# Patient Record
Sex: MALE | Race: BLACK OR AFRICAN AMERICAN | NOT HISPANIC OR LATINO | Employment: OTHER | URBAN - METROPOLITAN AREA
[De-identification: names, ages, dates, MRNs, and addresses within clinical notes are randomized per-mention and may not be internally consistent; named-entity substitution may affect disease eponyms.]

---

## 2017-06-03 ENCOUNTER — HOSPITAL ENCOUNTER (EMERGENCY)
Facility: OTHER | Age: 52
Discharge: HOME | End: 2017-06-03
Attending: EMERGENCY MEDICINE
Payer: MEDICARE

## 2017-06-03 VITALS
TEMPERATURE: 98 F | SYSTOLIC BLOOD PRESSURE: 118 MMHG | BODY MASS INDEX: 31.39 KG/M2 | DIASTOLIC BLOOD PRESSURE: 72 MMHG | RESPIRATION RATE: 18 BRPM | HEIGHT: 67 IN | OXYGEN SATURATION: 98 % | HEART RATE: 82 BPM | WEIGHT: 200 LBS

## 2017-06-03 DIAGNOSIS — B37.81 CANDIDA ESOPHAGITIS: ICD-10-CM

## 2017-06-03 DIAGNOSIS — L02.11 ABSCESS, NECK: Primary | ICD-10-CM

## 2017-06-03 PROCEDURE — 10060 I&D ABSCESS SIMPLE/SINGLE: CPT

## 2017-06-03 PROCEDURE — 99283 EMERGENCY DEPT VISIT LOW MDM: CPT | Mod: 25

## 2017-06-03 PROCEDURE — 25000003 PHARM REV CODE 250: Performed by: EMERGENCY MEDICINE

## 2017-06-03 RX ORDER — FLUCONAZOLE 200 MG/1
200 TABLET ORAL DAILY
Qty: 14 TABLET | Refills: 0 | Status: SHIPPED | OUTPATIENT
Start: 2017-06-03 | End: 2017-06-17

## 2017-06-03 RX ORDER — SULFAMETHOXAZOLE AND TRIMETHOPRIM 800; 160 MG/1; MG/1
1 TABLET ORAL
Status: COMPLETED | OUTPATIENT
Start: 2017-06-03 | End: 2017-06-03

## 2017-06-03 RX ORDER — LIDOCAINE HYDROCHLORIDE AND EPINEPHRINE 10; 10 MG/ML; UG/ML
10 INJECTION, SOLUTION INFILTRATION; PERINEURAL
Status: DISCONTINUED | OUTPATIENT
Start: 2017-06-03 | End: 2017-06-03

## 2017-06-03 RX ORDER — SULFAMETHOXAZOLE AND TRIMETHOPRIM 800; 160 MG/1; MG/1
1 TABLET ORAL 2 TIMES DAILY
Qty: 12 TABLET | Refills: 0 | Status: SHIPPED | OUTPATIENT
Start: 2017-06-03 | End: 2017-06-10

## 2017-06-03 RX ORDER — LIDOCAINE HYDROCHLORIDE 10 MG/ML
5 INJECTION INFILTRATION; PERINEURAL
Status: COMPLETED | OUTPATIENT
Start: 2017-06-03 | End: 2017-06-03

## 2017-06-03 RX ORDER — FLUCONAZOLE 100 MG/1
400 TABLET ORAL
Status: COMPLETED | OUTPATIENT
Start: 2017-06-03 | End: 2017-06-03

## 2017-06-03 RX ADMIN — LIDOCAINE HYDROCHLORIDE 5 ML: 10 INJECTION, SOLUTION INFILTRATION; PERINEURAL at 05:06

## 2017-06-03 RX ADMIN — SULFAMETHOXAZOLE AND TRIMETHOPRIM 1 TABLET: 800; 160 TABLET ORAL at 05:06

## 2017-06-03 RX ADMIN — FLUCONAZOLE 400 MG: 100 TABLET ORAL at 04:06

## 2017-06-03 RX ADMIN — LIDOCAINE HYDROCHLORIDE: 20 SOLUTION ORAL; TOPICAL at 04:06

## 2017-06-03 NOTE — ED PROVIDER NOTES
"Encounter Date: 6/3/2017    SCRIBE #1 NOTE: I, Elma Acevedosanto, am scribing for, and in the presence of, Dr. Summers.       History     Chief Complaint   Patient presents with    Insect Bite     pt reports "i have a spider bite on my neck"    Sore Throat     my throat hurts too     Review of patient's allergies indicates:   Allergen Reactions    Iodine and iodide containing products      Time seen by provider: 4:35 AM    This is a 51 y.o. male with history of HTN, HIV, and seizures who presents with complaint of sore throat that has persisted for 1 week. The patient mentions no associated symptoms including fever, chills, congestion, rhinorrhea, cough, SOB, abdominal pain, nausea, or vomiting. He also complains of an abscess to the right lateral neck that has persisted for the past week. He states that he attempted to "pop" the abscess yesterday, and "white pus" erupted from it. He mentions no redness or warmth. He reports no identifying, alleviating, or exacerbating factors. The patient admits that he has not attempted to alleviate his discomfort with any OTC medication. He denies any recent hospitalizations. Although he is unsure as to his last T-cell count, he states that he has been told by his infectious disease specialist that his viral load was undetectable and is compliant with HAART.      The history is provided by the patient.     Past Medical History:   Diagnosis Date    HIV (human immunodeficiency virus infection)     Hypertension     Immune deficiency disorder     Seizures      History reviewed. No pertinent surgical history.  Family History   Problem Relation Age of Onset    Cancer Father      Social History   Substance Use Topics    Smoking status: Never Smoker    Smokeless tobacco: Never Used    Alcohol use 18.0 oz/week     30 Cans of beer per week      Comment: Pt reports drinking about 1 6 pack of beer daily.      Review of Systems   Constitutional: Negative for chills and fever. "   HENT: Positive for sore throat. Negative for congestion, facial swelling and rhinorrhea.    Respiratory: Negative for cough, chest tightness and shortness of breath.    Cardiovascular: Negative for chest pain.   Gastrointestinal: Negative for abdominal pain, nausea and vomiting.   Endocrine: Negative for polyuria.   Genitourinary: Negative for dysuria.   Musculoskeletal: Negative for myalgias.   Skin: Negative for color change and rash.        Positive for abscess. Negative for warmth.   Neurological: Negative for headaches.       Physical Exam     Initial Vitals [06/03/17 0250]   BP Pulse Resp Temp SpO2   133/88 81 18 97.9 °F (36.6 °C) 96 %     Physical Exam    Nursing note and vitals reviewed.  Constitutional: He appears well-developed and well-nourished. He is not diaphoretic. No distress.   HENT:   Head: Normocephalic and atraumatic.   Right Ear: External ear normal.   Left Ear: External ear normal.   White lesions to the posterior pharynx consistent with candida.   Eyes: EOM are normal. Right eye exhibits no discharge. Left eye exhibits no discharge.   Neck: Normal range of motion.   Cardiovascular: Normal rate, regular rhythm and normal heart sounds. Exam reveals no gallop and no friction rub.    No murmur heard.  Pulmonary/Chest: Breath sounds normal. No respiratory distress. He has no wheezes. He has no rhonchi. He has no rales.   Abdominal: Soft. There is no tenderness. There is no rebound and no guarding.   Musculoskeletal: Normal range of motion. He exhibits no edema or tenderness.   Neurological: He is alert and oriented to person, place, and time.   Skin: Skin is warm and dry. Abscess noted. No rash noted. No erythema. No pallor.   Indurated mass consistent with abscess approximately 4 cm in diameter to the right lateral neck. No surrounding erythema or drainage.    Psychiatric: He has a normal mood and affect. His behavior is normal. Judgment and thought content normal.         ED Course   I & D -  Incision and Drainage  Date/Time: 6/3/2017 6:14 AM  Performed by: CORIE MCINTYRE  Authorized by: CORIE MCINTYRE   Consent Done: Yes  Consent: Verbal consent obtained.  Risks and benefits: risks, benefits and alternatives were discussed  Consent given by: patient  Type: abscess  Body area: head/neck  Location details: neck  Anesthesia: local infiltration    Anesthesia:  Anesthesia: local infiltration  Local Anesthetic: lidocaine 1% without epinephrine   Anesthetic total: 2 mL  Patient sedated: no  Scalpel size: 11  Incision type: single straight  Complexity: simple  Drainage: bloody and  purulent  Wound treatment: incision and  drainage  Packing material: 1/4 in gauze  Complications: No  Patient tolerance: Patient tolerated the procedure well with no immediate complications        Labs Reviewed - No data to display   Imaging Results    None                 Medical Decision Making:   Initial Assessment:       51M h/o HIV presents with     1) posterior neck abscess, persistent s/p attempted drainage at home. Area indurated with no surrounding cellulitis; bedside sono with small fluid collection so I+D done with small amount drainage, packed and will start Bactrim, f/u PCP within 2 days for packing removal and wound check.    2) sore throat- exam c/w candidal lesions posterior pharynx with no other sign thrush, concerning for candidal esophagitis. Unknown CD4 count but per pt viral load undetectable, on HAART. Will start Diflucan and pt will f/u ID for CD4 count check, re-assess and possible GI referral for EGD if no improvement. No sign strep throat or HSV, pt tolerating PO with no fever or systemic sx to warrant labs or IV treatment.              Scribe Attestation:   Scribe #1: I performed the above scribed service and the documentation accurately describes the services I performed. I attest to the accuracy of the note.    Attending Attestation:           Physician Attestation for Scribe:  Physician  Attestation Statement for Scribe #1: I, Dr. Summers, reviewed documentation, as scribed by Elma Rolon in my presence, and it is both accurate and complete.                 ED Course     Clinical Impression:     1. Abscess, neck    2. Candida esophagitis                Black Summers MD  06/04/17 1059

## 2018-09-22 ENCOUNTER — HOSPITAL ENCOUNTER (EMERGENCY)
Facility: OTHER | Age: 53
Discharge: HOME | End: 2018-09-22
Attending: EMERGENCY MEDICINE
Payer: MEDICARE

## 2018-09-22 VITALS
HEART RATE: 84 BPM | SYSTOLIC BLOOD PRESSURE: 125 MMHG | BODY MASS INDEX: 31.97 KG/M2 | OXYGEN SATURATION: 95 % | DIASTOLIC BLOOD PRESSURE: 74 MMHG | TEMPERATURE: 98 F | RESPIRATION RATE: 17 BRPM | HEIGHT: 67 IN | WEIGHT: 203.69 LBS

## 2018-09-22 DIAGNOSIS — M25.569 KNEE PAIN: Primary | ICD-10-CM

## 2018-09-22 DIAGNOSIS — M10.372 ACUTE GOUT DUE TO RENAL IMPAIRMENT INVOLVING TOE OF LEFT FOOT: ICD-10-CM

## 2018-09-22 PROCEDURE — 63600175 PHARM REV CODE 636 W HCPCS: Performed by: EMERGENCY MEDICINE

## 2018-09-22 PROCEDURE — 96372 THER/PROPH/DIAG INJ SC/IM: CPT

## 2018-09-22 PROCEDURE — 99283 EMERGENCY DEPT VISIT LOW MDM: CPT | Mod: 25

## 2018-09-22 RX ORDER — KETOROLAC TROMETHAMINE 30 MG/ML
60 INJECTION, SOLUTION INTRAMUSCULAR; INTRAVENOUS
Status: COMPLETED | OUTPATIENT
Start: 2018-09-22 | End: 2018-09-22

## 2018-09-22 RX ORDER — INDOMETHACIN 50 MG/1
50 CAPSULE ORAL
Qty: 15 CAPSULE | Refills: 0 | Status: SHIPPED | OUTPATIENT
Start: 2018-09-22

## 2018-09-22 RX ORDER — TRAMADOL HYDROCHLORIDE 50 MG/1
50 TABLET ORAL EVERY 6 HOURS PRN
COMMUNITY
End: 2023-09-14

## 2018-09-22 RX ADMIN — KETOROLAC TROMETHAMINE 60 MG: 30 INJECTION, SOLUTION INTRAMUSCULAR at 08:09

## 2018-09-22 NOTE — ED PROVIDER NOTES
"Encounter Date: 9/22/2018    SCRIBE #1 NOTE: I, Kristine Claros, am scribing for, and in the presence of,  Dr. Clemente I have scribed the entire note.       History     Chief Complaint   Patient presents with    Gout     to L leg x 4 days and "I got a berl on my butt"     Time seen by provider: 7:40 AM    This is a 53 y.o. male who presents with complaint of gout since Wednesday. He notes that he only takes medicine when necessary, and is not on anything daily for gout. He reports pain to the left knee. He also notes pain and swelling in the left foot. He denies wrapping the knee. He denies any traumatic event that could cause pain in his knee.  He denies having gout in his knee in the past.  He denies being diabetic.  He denies any fevers, numbness/tingling.  He denies any erythema or swelling of that knee.      He notes that he has a boil on his buttock for the past few days.  He did note that it was draining but has not any more.      The history is provided by the patient.     Review of patient's allergies indicates:   Allergen Reactions    Iodine and iodide containing products      Past Medical History:   Diagnosis Date    HIV (human immunodeficiency virus infection)     Hypertension     Immune deficiency disorder     Seizures      History reviewed. No pertinent surgical history.  Family History   Problem Relation Age of Onset    Cancer Father      Social History     Tobacco Use    Smoking status: Never Smoker    Smokeless tobacco: Never Used   Substance Use Topics    Alcohol use: Yes     Alcohol/week: 18.0 oz     Types: 30 Cans of beer per week     Comment: Pt reports drinking about 1 6 pack of beer daily.     Drug use: No     Review of Systems   Constitutional: Negative for chills and fever.   Gastrointestinal: Negative for constipation, diarrhea, nausea and vomiting.   Genitourinary: Negative for urgency.   Musculoskeletal: Positive for joint swelling. Negative for back pain.   Skin: Negative for color " change and wound.        Positive for boil on lower backside   Neurological: Negative for dizziness and numbness.   All other systems reviewed and are negative.      Physical Exam     Initial Vitals [09/22/18 0636]   BP Pulse Resp Temp SpO2   132/81 87 20 98.4 °F (36.9 °C) 97 %      MAP       --         Physical Exam    Nursing note and vitals reviewed.  Constitutional: He appears well-developed and well-nourished. He is not diaphoretic. No distress.   HENT:   Head: Normocephalic and atraumatic.   Eyes: Conjunctivae and EOM are normal. No scleral icterus.   Neck: Normal range of motion. Neck supple.   Cardiovascular:   2+ DP/PT pulses bilaterally.   Pulmonary/Chest: No respiratory distress.   Musculoskeletal: Normal range of motion. He exhibits no edema or tenderness.   L knee:  Tenderness along the medial joint line.  No erythema, no swelling. No warmth to touch.  Full range of motion intact. No joint instability or laxity.    L foot:  Swelling and tenderness noted to the 1st left metatarsal.  No erythema.  No warmth to touch.   Lymphadenopathy:     He has no cervical adenopathy.   Neurological: He is alert and oriented to person, place, and time.   Skin: Skin is warm and dry. No rash noted. No erythema. No pallor.   L buttock:  1cm area of swelling, induration, tenderness along the medial aspect.  No drainage or fluctuation.  No erythema.         ED Course   Procedures  Labs Reviewed - No data to display          Imaging Results    None       X-Rays:   Independently Interpreted Readings:   Other Readings:  Knee XR:  No acute fracture or dislocation.    Medical Decision Making:   History:   Old Medical Records: I decided to obtain old medical records.  Old Records Summarized: other records and records from clinic visits.  Initial Assessment:   7:40AM:  Patient is a 53-year-old male who presents to the emergency department with left knee and foot pain.  Patient appears well, nontoxic.  The foot does appear to have  gout with swelling and tenderness to the metatarsal joint.  However, the knee does not seem consistent with gout.  He does not have any swelling or erythema.  He has focal tenderness to the medial joint line.  Will plan for NSAIDs, x-ray, will continue to follow and reassess.  Independently Interpreted Test(s):   I have ordered and independently interpreted X-rays - see prior notes.  Clinical Tests:   Radiological Study: Ordered and Reviewed      8:33 AM:  Patient doing well. He remains stable. His pain has improved after the NSAIDs.  His x-ray is negative for any acute findings.  Will plan to treat with a course of NSAIDs, which would alleviate any gout symptoms, along with any acute knee issues.  Will plan for an Ace bandage.  I updated the patient regarding the results and I counseled the patient regarding supportive care measures.  I have discussed with the pt ED return warnings and need for close PCP f/u.  Pt agreeable to plan and all questions answered.  I feel that pt is stable for discharge and management as an outpatient and no further intervention is needed at this time.  Pt is comfortable returning to the ED if needed.  Will DC home in stable condition.                Scribe Attestation:   Scribe #1: I performed the above scribed service and the documentation accurately describes the services I performed. I attest to the accuracy of the note.    Attending Attestation:           Physician Attestation for Scribe:  Physician Attestation Statement for Scribe #1: I, Dr. Pryor, reviewed documentation, as scribed by Kristine Claros in my presence, and it is both accurate and complete.                    Clinical Impression:     1. Knee pain    2. Acute gout due to renal impairment involving toe of left foot                                   Ximena Pryor MD  09/22/18 3460

## 2018-09-22 NOTE — ED TRIAGE NOTES
"Pt reports to ED c/o bilateral knee and ankle pain x 4 days. Pt reports "gout flare-up" and ran out of medication. Also reports abscess to buttocks "but I think it bust".     Denies recent fall or trauma.   "

## 2019-03-23 ENCOUNTER — HOSPITAL ENCOUNTER (EMERGENCY)
Facility: OTHER | Age: 54
Discharge: HOME | End: 2019-03-23
Attending: EMERGENCY MEDICINE
Payer: MEDICARE

## 2019-03-23 VITALS
HEIGHT: 68 IN | BODY MASS INDEX: 30.01 KG/M2 | WEIGHT: 198 LBS | SYSTOLIC BLOOD PRESSURE: 160 MMHG | DIASTOLIC BLOOD PRESSURE: 97 MMHG | HEART RATE: 87 BPM | TEMPERATURE: 99 F | RESPIRATION RATE: 16 BRPM | OXYGEN SATURATION: 97 %

## 2019-03-23 DIAGNOSIS — R73.9 HYPERGLYCEMIA, UNSPECIFIED: ICD-10-CM

## 2019-03-23 DIAGNOSIS — B37.42 CANDIDAL BALANITIS: Primary | ICD-10-CM

## 2019-03-23 LAB — POCT GLUCOSE: 303 MG/DL (ref 70–110)

## 2019-03-23 PROCEDURE — 99283 EMERGENCY DEPT VISIT LOW MDM: CPT

## 2019-03-23 PROCEDURE — 82962 GLUCOSE BLOOD TEST: CPT

## 2019-03-23 RX ORDER — CLOTRIMAZOLE 1 %
CREAM (GRAM) TOPICAL
Qty: 15 G | Refills: 0 | Status: SHIPPED | OUTPATIENT
Start: 2019-03-23

## 2019-03-23 RX ORDER — ACYCLOVIR 400 MG/1
TABLET ORAL 2 TIMES DAILY
COMMUNITY

## 2019-03-23 RX ORDER — METFORMIN HYDROCHLORIDE 500 MG/1
500 TABLET ORAL
Qty: 30 TABLET | Refills: 0 | Status: SHIPPED | OUTPATIENT
Start: 2019-03-23 | End: 2020-03-22

## 2019-03-23 NOTE — ED NOTES
CAPILLARY BLOOD GLUCOSE:  303    Dr. Pryor notified of elevated CBG. Ok to move pt to RWR per Dr. Pryor.

## 2019-03-23 NOTE — ED PROVIDER NOTES
"Encounter Date: 3/23/2019    SCRIBE #1 NOTE: I, Melafrancoise Yeh, am scribing for, and in the presence of, Dr. Pryor.       History     Chief Complaint   Patient presents with    Penis Pain     PMH of genital herpes, reporting "havent had an outbreak in years." Reporting sores to penis x several days, denies fever, chills.      Time seen by provider: 4:20 PM    This is a 53 y.o. Male, with history of HIV, genital herpes, and HTN, who presents to the ED with complaint of sudden onset sores and ulcers to the penile head for the past thee days. Patient states the ulcers normally come on the skin surrounding the penis when he has a herpes flare up. Patient denies any new sexual partners. Patient denies any fever, chills, or penile discharge. Patient states he has been increasing his water intake which leads to urine frequency. Patient denies history of DM. Patient's PCP is at [facility].      The history is provided by the patient.     Review of patient's allergies indicates:   Allergen Reactions    Iodine and iodide containing products      Past Medical History:   Diagnosis Date    HIV (human immunodeficiency virus infection)     Hypertension     Immune deficiency disorder     Seizures      No past surgical history on file.  Family History   Problem Relation Age of Onset    Cancer Father      Social History     Tobacco Use    Smoking status: Never Smoker    Smokeless tobacco: Never Used   Substance Use Topics    Alcohol use: Yes     Alcohol/week: 18.0 oz     Types: 30 Cans of beer per week     Comment: Pt reports drinking about 1 6 pack of beer daily.     Drug use: No     Review of Systems   Constitutional: Negative for chills and fever.   HENT: Negative for sore throat.    Respiratory: Negative for shortness of breath.    Cardiovascular: Negative for chest pain.   Gastrointestinal: Negative for nausea.   Genitourinary: Positive for frequency and penile pain. Negative for discharge and dysuria.        Positive for " penile ulcers/sores.   Musculoskeletal: Negative for back pain.   Skin: Negative for rash.   Neurological: Negative for weakness.   Hematological: Does not bruise/bleed easily.       Physical Exam     Initial Vitals [03/23/19 1443]   BP Pulse Resp Temp SpO2   (!) 148/95 86 16 98 °F (36.7 °C) 95 %      MAP       --         Physical Exam    Nursing note and vitals reviewed.  Constitutional: He appears well-developed and well-nourished. He is not diaphoretic. No distress.   Obese.   HENT:   Head: Normocephalic and atraumatic.   Eyes: Conjunctivae and EOM are normal. Pupils are equal, round, and reactive to light.   Neck: Normal range of motion. Neck supple.   Genitourinary:   Genitourinary Comments: Normal, uncircumcised male genitalia. No inguinal hernia or lymphadenopathy. Scrotum and testes normal. Whitish exudates on glands under foreskin. Interior foreskin with minor excoriations and erythema, but able to full retract. Whitish lesions on gland with some crusting, unclear if these were originally vesicular.   Musculoskeletal: Normal range of motion. He exhibits no edema or tenderness.   Neurological: He is alert and oriented to person, place, and time. He has normal strength.   Skin: Skin is warm and dry. No rash noted. No erythema. No pallor.   Psychiatric: He has a normal mood and affect. His behavior is normal. Judgment and thought content normal.         ED Course   Procedures  Labs Reviewed   POCT GLUCOSE - Abnormal; Notable for the following components:       Result Value    POCT Glucose 303 (*)     All other components within normal limits          Imaging Results    None                     Scribe Attestation:   Scribe #1: I performed the above scribed service and the documentation accurately describes the services I performed. I attest to the accuracy of the note.    Attending Attestation:           Physician Attestation for Scribe:  Physician Attestation Statement for Scribe #1: I, Dr. Pryor, reviewed  documentation, as scribed by Yajaira Yeh in my presence, and it is both accurate and complete.           Patient presents complaining of irritation sores on the head of the penis for the past few days.  Reports he has had genital herpes in the past and thinks this is an outbreak of it however the sores have turned white.  On my exam he has appears to be candidal balanitis.  There is some skin breakdown on the glans but is unclear if these were originally vesicular/herpetic in nature.  Although he did not have a history of diabetes due to the presence of candidal balanitis obtain an Accu-Chek which is elevated at 300.  Patient has been advised to decrease simple carbohydrates for from his diet.  Will be started on low-dose Glucophage and stressed the need to follow up with his primary care clinic this week for formal diabetic testing, further adjustment of medications as necessary.  Start topical clotrimazole for the balanitis             Clinical Impression:     1. Candidal balanitis    2. Hyperglycemia, unspecified                                   Clemente Pryor II, MD  03/24/19 9062

## 2019-12-26 ENCOUNTER — HOSPITAL ENCOUNTER (EMERGENCY)
Facility: OTHER | Age: 54
Discharge: HOME | End: 2019-12-26
Attending: EMERGENCY MEDICINE
Payer: MEDICARE

## 2019-12-26 VITALS
RESPIRATION RATE: 18 BRPM | HEART RATE: 89 BPM | DIASTOLIC BLOOD PRESSURE: 96 MMHG | TEMPERATURE: 98 F | SYSTOLIC BLOOD PRESSURE: 161 MMHG | WEIGHT: 200 LBS | HEIGHT: 68 IN | OXYGEN SATURATION: 96 % | BODY MASS INDEX: 30.31 KG/M2

## 2019-12-26 DIAGNOSIS — L03.90 CELLULITIS, UNSPECIFIED CELLULITIS SITE: ICD-10-CM

## 2019-12-26 DIAGNOSIS — Z76.89 ENCOUNTER FOR INCISION AND DRAINAGE PROCEDURE: Primary | ICD-10-CM

## 2019-12-26 PROCEDURE — 10060 I&D ABSCESS SIMPLE/SINGLE: CPT

## 2019-12-26 PROCEDURE — 99284 EMERGENCY DEPT VISIT MOD MDM: CPT | Mod: 25

## 2019-12-26 PROCEDURE — 25000003 PHARM REV CODE 250: Performed by: PHYSICIAN ASSISTANT

## 2019-12-26 RX ORDER — IBUPROFEN 600 MG/1
600 TABLET ORAL EVERY 6 HOURS PRN
Qty: 20 TABLET | Refills: 0 | Status: SHIPPED | OUTPATIENT
Start: 2019-12-26

## 2019-12-26 RX ORDER — LIDOCAINE HYDROCHLORIDE 10 MG/ML
10 INJECTION INFILTRATION; PERINEURAL
Status: COMPLETED | OUTPATIENT
Start: 2019-12-26 | End: 2019-12-26

## 2019-12-26 RX ORDER — MUPIROCIN 20 MG/G
OINTMENT TOPICAL 3 TIMES DAILY
Qty: 22 G | Refills: 0 | Status: SHIPPED | OUTPATIENT
Start: 2019-12-26 | End: 2020-01-05

## 2019-12-26 RX ORDER — CLINDAMYCIN HYDROCHLORIDE 300 MG/1
300 CAPSULE ORAL 4 TIMES DAILY
Qty: 40 CAPSULE | Refills: 0 | Status: SHIPPED | OUTPATIENT
Start: 2019-12-26 | End: 2020-01-05

## 2019-12-26 RX ADMIN — LIDOCAINE HYDROCHLORIDE 10 ML: 10 INJECTION, SOLUTION INFILTRATION; PERINEURAL at 09:12

## 2019-12-27 NOTE — ED TRIAGE NOTES
Patient presents to ER with c/o abscess under Left arm for 2 days.  Patient states the area began to drain on yesterday.  Patient reports area painful and swollen.  Patient denies taking anything OTC for pain.

## 2022-11-07 ENCOUNTER — HOSPITAL ENCOUNTER (EMERGENCY)
Facility: OTHER | Age: 57
Discharge: HOME | End: 2022-11-07
Attending: EMERGENCY MEDICINE
Payer: MEDICARE

## 2022-11-07 VITALS
SYSTOLIC BLOOD PRESSURE: 163 MMHG | DIASTOLIC BLOOD PRESSURE: 88 MMHG | TEMPERATURE: 98 F | BODY MASS INDEX: 30.31 KG/M2 | HEIGHT: 68 IN | RESPIRATION RATE: 18 BRPM | OXYGEN SATURATION: 98 % | WEIGHT: 200 LBS | HEART RATE: 62 BPM

## 2022-11-07 DIAGNOSIS — K85.90 ACUTE PANCREATITIS WITHOUT INFECTION OR NECROSIS, UNSPECIFIED PANCREATITIS TYPE: Primary | ICD-10-CM

## 2022-11-07 DIAGNOSIS — K29.70 GASTRITIS, PRESENCE OF BLEEDING UNSPECIFIED, UNSPECIFIED CHRONICITY, UNSPECIFIED GASTRITIS TYPE: ICD-10-CM

## 2022-11-07 DIAGNOSIS — K20.90 ESOPHAGITIS: ICD-10-CM

## 2022-11-07 LAB
ALBUMIN SERPL BCP-MCNC: 4.1 G/DL (ref 3.5–5.2)
ALP SERPL-CCNC: 65 U/L (ref 55–135)
ALT SERPL W/O P-5'-P-CCNC: 19 U/L (ref 10–44)
ANION GAP SERPL CALC-SCNC: 8 MMOL/L (ref 8–16)
AST SERPL-CCNC: 30 U/L (ref 10–40)
BASOPHILS # BLD AUTO: 0.08 K/UL (ref 0–0.2)
BASOPHILS NFR BLD: 1.3 % (ref 0–1.9)
BILIRUB SERPL-MCNC: 0.3 MG/DL (ref 0.1–1)
BILIRUB UR QL STRIP: NEGATIVE
BNP SERPL-MCNC: 15 PG/ML (ref 0–99)
BUN SERPL-MCNC: 11 MG/DL (ref 6–20)
CALCIUM SERPL-MCNC: 9.8 MG/DL (ref 8.7–10.5)
CHLORIDE SERPL-SCNC: 103 MMOL/L (ref 95–110)
CLARITY UR: CLEAR
CO2 SERPL-SCNC: 26 MMOL/L (ref 23–29)
COLOR UR: YELLOW
CREAT SERPL-MCNC: 1.3 MG/DL (ref 0.5–1.4)
CTP QC/QA: YES
CTP QC/QA: YES
DIFFERENTIAL METHOD: ABNORMAL
EOSINOPHIL # BLD AUTO: 0.1 K/UL (ref 0–0.5)
EOSINOPHIL NFR BLD: 2.3 % (ref 0–8)
ERYTHROCYTE [DISTWIDTH] IN BLOOD BY AUTOMATED COUNT: 13.1 % (ref 11.5–14.5)
EST. GFR  (NO RACE VARIABLE): >60 ML/MIN/1.73 M^2
GLUCOSE SERPL-MCNC: 170 MG/DL (ref 70–110)
GLUCOSE UR QL STRIP: NEGATIVE
HCT VFR BLD AUTO: 42.7 % (ref 40–54)
HGB BLD-MCNC: 14.5 G/DL (ref 14–18)
HGB UR QL STRIP: NEGATIVE
IMM GRANULOCYTES # BLD AUTO: 0.02 K/UL (ref 0–0.04)
IMM GRANULOCYTES NFR BLD AUTO: 0.3 % (ref 0–0.5)
KETONES UR QL STRIP: NEGATIVE
LEUKOCYTE ESTERASE UR QL STRIP: NEGATIVE
LIPASE SERPL-CCNC: 201 U/L (ref 4–60)
LYMPHOCYTES # BLD AUTO: 3 K/UL (ref 1–4.8)
LYMPHOCYTES NFR BLD: 49.4 % (ref 18–48)
MCH RBC QN AUTO: 27.5 PG (ref 27–31)
MCHC RBC AUTO-ENTMCNC: 34 G/DL (ref 32–36)
MCV RBC AUTO: 81 FL (ref 82–98)
MONOCYTES # BLD AUTO: 0.4 K/UL (ref 0.3–1)
MONOCYTES NFR BLD: 7.1 % (ref 4–15)
NEUTROPHILS # BLD AUTO: 2.4 K/UL (ref 1.8–7.7)
NEUTROPHILS NFR BLD: 39.6 % (ref 38–73)
NITRITE UR QL STRIP: NEGATIVE
NRBC BLD-RTO: 0 /100 WBC
PH UR STRIP: 8 [PH] (ref 5–8)
PLATELET # BLD AUTO: 247 K/UL (ref 150–450)
PMV BLD AUTO: 8.6 FL (ref 9.2–12.9)
POC MOLECULAR INFLUENZA A AGN: NEGATIVE
POC MOLECULAR INFLUENZA B AGN: NEGATIVE
POTASSIUM SERPL-SCNC: 4.7 MMOL/L (ref 3.5–5.1)
PROT SERPL-MCNC: 8.3 G/DL (ref 6–8.4)
PROT UR QL STRIP: NEGATIVE
RBC # BLD AUTO: 5.28 M/UL (ref 4.6–6.2)
SARS-COV-2 RDRP RESP QL NAA+PROBE: NEGATIVE
SODIUM SERPL-SCNC: 137 MMOL/L (ref 136–145)
SP GR UR STRIP: 1.01 (ref 1–1.03)
TROPONIN I SERPL DL<=0.01 NG/ML-MCNC: 0.01 NG/ML (ref 0–0.03)
URN SPEC COLLECT METH UR: NORMAL
UROBILINOGEN UR STRIP-ACNC: NEGATIVE EU/DL
WBC # BLD AUTO: 6.16 K/UL (ref 3.9–12.7)

## 2022-11-07 PROCEDURE — 83690 ASSAY OF LIPASE: CPT | Performed by: PHYSICIAN ASSISTANT

## 2022-11-07 PROCEDURE — 85025 COMPLETE CBC W/AUTO DIFF WBC: CPT | Performed by: PHYSICIAN ASSISTANT

## 2022-11-07 PROCEDURE — 25500020 PHARM REV CODE 255: Performed by: PHYSICIAN ASSISTANT

## 2022-11-07 PROCEDURE — 87635 SARS-COV-2 COVID-19 AMP PRB: CPT | Performed by: PHYSICIAN ASSISTANT

## 2022-11-07 PROCEDURE — 84484 ASSAY OF TROPONIN QUANT: CPT | Performed by: PHYSICIAN ASSISTANT

## 2022-11-07 PROCEDURE — 83880 ASSAY OF NATRIURETIC PEPTIDE: CPT | Performed by: PHYSICIAN ASSISTANT

## 2022-11-07 PROCEDURE — 96375 TX/PRO/DX INJ NEW DRUG ADDON: CPT

## 2022-11-07 PROCEDURE — 80053 COMPREHEN METABOLIC PANEL: CPT | Performed by: PHYSICIAN ASSISTANT

## 2022-11-07 PROCEDURE — 63600175 PHARM REV CODE 636 W HCPCS: Performed by: PHYSICIAN ASSISTANT

## 2022-11-07 PROCEDURE — 81003 URINALYSIS AUTO W/O SCOPE: CPT | Performed by: PHYSICIAN ASSISTANT

## 2022-11-07 PROCEDURE — 25000003 PHARM REV CODE 250: Performed by: PHYSICIAN ASSISTANT

## 2022-11-07 PROCEDURE — 99285 EMERGENCY DEPT VISIT HI MDM: CPT | Mod: 25

## 2022-11-07 PROCEDURE — 96361 HYDRATE IV INFUSION ADD-ON: CPT

## 2022-11-07 PROCEDURE — 96374 THER/PROPH/DIAG INJ IV PUSH: CPT

## 2022-11-07 RX ORDER — OXYCODONE AND ACETAMINOPHEN 5; 325 MG/1; MG/1
1 TABLET ORAL EVERY 6 HOURS PRN
Qty: 12 TABLET | Refills: 0 | Status: SHIPPED | OUTPATIENT
Start: 2022-11-07

## 2022-11-07 RX ORDER — DIPHENHYDRAMINE HYDROCHLORIDE 50 MG/ML
25 INJECTION INTRAMUSCULAR; INTRAVENOUS
Status: COMPLETED | OUTPATIENT
Start: 2022-11-07 | End: 2022-11-07

## 2022-11-07 RX ORDER — ONDANSETRON 4 MG/1
4 TABLET, ORALLY DISINTEGRATING ORAL EVERY 6 HOURS PRN
Qty: 15 TABLET | Refills: 0 | OUTPATIENT
Start: 2022-11-07 | End: 2023-09-14

## 2022-11-07 RX ORDER — MORPHINE SULFATE 4 MG/ML
4 INJECTION, SOLUTION INTRAMUSCULAR; INTRAVENOUS
Status: COMPLETED | OUTPATIENT
Start: 2022-11-07 | End: 2022-11-07

## 2022-11-07 RX ADMIN — DIPHENHYDRAMINE HYDROCHLORIDE 25 MG: 50 INJECTION, SOLUTION INTRAMUSCULAR; INTRAVENOUS at 12:11

## 2022-11-07 RX ADMIN — MORPHINE SULFATE 4 MG: 4 INJECTION, SOLUTION INTRAMUSCULAR; INTRAVENOUS at 12:11

## 2022-11-07 RX ADMIN — SODIUM CHLORIDE 1000 ML: 0.9 INJECTION, SOLUTION INTRAVENOUS at 12:11

## 2022-11-07 RX ADMIN — IOHEXOL 100 ML: 350 INJECTION, SOLUTION INTRAVENOUS at 01:11

## 2022-11-07 NOTE — ED NOTES
Pt presented to ER with c/o SOB which has worsened over the last 3 days. No acute distress noted. Family at bedside. Will continue to monitor.     LOC: The patient is awake, alert, and oriented to self, place, time, and situation. Pt is calm and cooperative. Affect is appropriate.  Speech is appropriate and clear.     APPEARANCE: Patient resting comfortably in no acute distress.  Patient is clean and well groomed.    SKIN: The skin is warm and dry; color consistent with ethnicity.  Patient has normal skin turgor and moist mucus membranes.  Skin intact; no breakdown or bruising noted.     MUSCULOSKELETAL: Patient moving upper and lower extremities without difficulty; denies pain in the extremities or back.  Denies weakness.     RESPIRATORY: Airway is open and patent. Respirations spontaneous, even, easy, and non-labored.  Patient has a normal effort and rate.  No accessory muscle use noted. Denies cough.     CARDIAC:  Normal rate noted.  No peripheral edema noted. Pt complaints of chest pressure when coughing. Stated he is always SOB.     ABDOMEN: Soft and non tender to palpation.  No distention noted. Pt denies abdominal pain; denies nausea, vomiting, diarrhea, or constipation.    NEUROLOGIC: Eyes open spontaneously.  Behavior appropriate to situation.  Follows commands; facial expression symmetrical.  Purposeful motor response noted; normal sensation in all extremities. Pt denies headache; denies lightheadedness or dizziness; denies visual disturbances; denies loss of balance; denies unilateral weakness.

## 2022-11-07 NOTE — ED PROVIDER NOTES
"Encounter Date: 11/7/2022       History     Chief Complaint   Patient presents with    Shortness of Breath     Patient to ED with c/o intermittent shortness of breath with chest discomfort and pain on inspiration x months . States " I haven't been able to go to work in 3 days . " EKG done in triage     Patient is a 56 y/o M HTN, T2DM, HIV (viral load undetectable), and seizures that presents to the ED today with worsening chest pain, shortness of breath and abdominal pain over the past 3 days.  Patient reports he has had occasional sharp chest pain and SOB x 2 years which usually resolves on its own.  Pain is becoming more frequent and longer in duration.  Seen by cardiology at [facility] in October 2021 and diagnosed with LVEDP with normal right heart filling pressures.  No obstructive coronary disease. He is currently being treated for HFpEF.  Patient reports penile and scrotal tenderness x 3 days.  No pain or burning with urination.  Last BM was this morning.  Patient also reports chronic lower leg tenderness which has been worsening x 3 days. Denies N/V/D, fever.      Review of patient's allergies indicates:   Allergen Reactions    Iodine and iodide containing products     Shellfish containing products      Past Medical History:   Diagnosis Date    HIV (human immunodeficiency virus infection)     Hypertension     Immune deficiency disorder     Seizures      No past surgical history on file.  Family History   Problem Relation Age of Onset    Cancer Father      Social History     Tobacco Use    Smoking status: Never    Smokeless tobacco: Never   Substance Use Topics    Alcohol use: Yes     Alcohol/week: 30.0 standard drinks     Types: 30 Cans of beer per week     Comment: Pt reports drinking about 1 6 pack of beer daily.     Drug use: No     Review of Systems   Constitutional:  Negative for activity change, appetite change and fever.   HENT:  Negative for congestion.    Respiratory:  Positive for shortness of " breath. Negative for cough.    Cardiovascular:  Positive for chest pain. Negative for palpitations and leg swelling.   Gastrointestinal:  Positive for abdominal pain. Negative for constipation, diarrhea, nausea and vomiting.   Endocrine: Negative for polyuria.   Genitourinary:  Positive for penile pain and testicular pain. Negative for difficulty urinating, flank pain and penile discharge.   Musculoskeletal:  Positive for arthralgias, back pain and myalgias.   Skin:  Negative for color change, pallor, rash and wound.   Allergic/Immunologic: Positive for immunocompromised state.   Neurological:  Positive for headaches.   Psychiatric/Behavioral:  Negative for confusion.      Physical Exam     Initial Vitals [11/07/22 0924]   BP Pulse Resp Temp SpO2   129/76 72 16 98.9 °F (37.2 °C) 96 %      MAP       --         Physical Exam    Constitutional: He appears well-developed and well-nourished.  Non-toxic appearance. He does not have a sickly appearance. No distress.   HENT:   Head: Normocephalic and atraumatic.   Eyes: Conjunctivae and EOM are normal.   Neck: Neck supple.   Normal range of motion.  Cardiovascular:  Normal rate and regular rhythm.     Exam reveals no gallop and no friction rub.       No murmur heard.  Pulmonary/Chest: Breath sounds normal. No respiratory distress. He exhibits tenderness (over xiphoid process).   Abdominal: Bowel sounds are normal. There is abdominal tenderness in the epigastric area and left upper quadrant.   Musculoskeletal:         General: Normal range of motion.      Cervical back: Normal range of motion and neck supple.     Neurological: He is alert and oriented to person, place, and time.   Skin: Skin is warm and dry.   Psychiatric: He has a normal mood and affect. His behavior is normal.       ED Course   Procedures  Labs Reviewed   CBC W/ AUTO DIFFERENTIAL   COMPREHENSIVE METABOLIC PANEL   TROPONIN I   B-TYPE NATRIURETIC PEPTIDE   LIPASE   URINALYSIS, REFLEX TO URINE CULTURE    SARS-COV-2 RDRP GENE   POCT INFLUENZA A/B MOLECULAR     EKG Readings: (Independently Interpreted)   EKG interpreted by myself  Normal sinus rhythm at a rate of 71 beats per minute.  No STEMI.  T-wave inversion to leads V4 through V6.  Not seen on prior EKG from 2014 but has had previous EKGs not able to be seen from [facility].     Imaging Results              X-Ray Chest 1 View (In process)  Result time 11/07/22 10:58:15                     Medications - No data to display  Medical Decision Making:   Initial Assessment:   57-year-old male with HIV with viral load undetectable, type 2 diabetes and LVEDP who is presenting to the emergency department with chest pain abdominal pain and shortness of breath.  Chest pain and shortness of breath or chronic but he was concerned because his symptoms had lasted 3 days when they will typically last 1 or 2 days.  He reports new onset abdominal pain.             ED Course as of 11/07/22 1421   Mon Nov 07, 2022   1419 Blood work reveals elevated lipase of 201.  Glucose slightly elevated.  No leukocytosis or left shift.  Troponin and BNP normal.  Urinalysis without signs of infection. [AG]   1420 CT abdomen pelvis impression:  1. Nonspecific wall thickening and submucosal edema of the visualized lower esophagus and proximal stomach could relate to esophagitis and gastritis, respectively.  Correlation advised.   2. Homogeneous appearance of the pancreatic parenchyma in a patient with clinically suspected acute pancreatitis.  No discrete drainable peripancreatic fluid collection or other complication identified.   3. Nonspecific haziness at the mesenteric root with prominent number lymph nodes could relate to reactive change related to infectious or inflammatory gastrointestinal process.  Mesenteric panniculitis could appear similar    Patient has known history of candidal esophagitis which he has EGD performed every 3 months.  Patient's pain was well controlled.  I did offer patient  admission but he declined at this time.  He would like to go home and attempts supportive care.  Patient given strict precautions to the ED. Wife at bedside amenable to plan as well. [AG]      ED Course User Index  [AG] Harjit Jones PA-C               Clinical Impression:      1. Acute pancreatitis without infection or necrosis, unspecified pancreatitis type    2. Gastritis, presence of bleeding unspecified, unspecified chronicity, unspecified gastritis type    3. Esophagitis               Harjit Jones PA-C  11/07/22 3238

## 2022-11-07 NOTE — Clinical Note
"Yoni Valentine" Coleen was seen and treated in our emergency department on 11/7/2022.  He may return to work on 11/09/2022.       If you have any questions or concerns, please don't hesitate to call.      DONALD Barber RN    "

## 2022-11-07 NOTE — FIRST PROVIDER EVALUATION
" Emergency Department TeleTriage Encounter Note      CHIEF COMPLAINT    Chief Complaint   Patient presents with    Shortness of Breath     Patient to ED with c/o intermittent shortness of breath with chest discomfort and pain on inspiration x months . States " I haven't been able to go to work in 3 days . " EKG done in triage       VITAL SIGNS   Initial Vitals [11/07/22 0924]   BP Pulse Resp Temp SpO2   129/76 72 16 98.9 °F (37.2 °C) 96 %      MAP       --            ALLERGIES    Review of patient's allergies indicates:   Allergen Reactions    Iodine and iodide containing products     Shellfish containing products        PROVIDER TRIAGE NOTE  This is a teletriage evaluation of a 57 y.o. male presenting to the ED complaining of shortness of breath. Patient reports shortness of breath and chest discomfort for approximately 1 month. Has been worsening over the last 3 days. He was seen at urgent care prior to arrival and tested negative for flu and covid.     Initial orders will be placed and care will be transferred to an alternate provider when patient is roomed for a full evaluation. Any additional orders and the final disposition will be determined by that provider.         ORDERS  Labs Reviewed   CBC W/ AUTO DIFFERENTIAL   COMPREHENSIVE METABOLIC PANEL   TROPONIN I   B-TYPE NATRIURETIC PEPTIDE       ED Orders (720h ago, onward)      Start Ordered     Status Ordering Provider    11/07/22 0940 11/07/22 0939  Troponin I  STAT         Ordered GABRIELA BARBER    11/07/22 0940 11/07/22 0939  Brain Natriuretic Peptide  STAT         Ordered GABRIELA BARBER    11/07/22 0939 11/07/22 0939  CBC Auto Differential  STAT         Ordered GABRIELA BARBER    11/07/22 0939 11/07/22 0939  Comprehensive Metabolic Panel  STAT         Ordered GABRIELA BARBER    11/07/22 0939 11/07/22 0939  Pulse Oximetry Continuous  Continuous         Ordered GABRIELA BARBER    11/07/22 0939 11/07/22 0939  Cardiac Monitoring - Adult  Continuous         " Ordered GABRIELA BARBER.    11/07/22 0939 11/07/22 0939  X-Ray Chest 1 View  1 time imaging         Ordered GABRIELA BARBER              Virtual Visit Note: The provider triage portion of this emergency department evaluation and documentation was performed via [employer], a HIPAA-compliant telemedicine application, in concert with a tele-presenter in the room. A face to face patient evaluation with one of my colleagues will occur once the patient is placed in an emergency department room.      DISCLAIMER: This note was prepared with [email or web address] voice recognition transcription software. Garbled syntax, mangled pronouns, and other bizarre constructions may be attributed to that software system.

## 2023-04-24 ENCOUNTER — HOSPITAL ENCOUNTER (EMERGENCY)
Facility: OTHER | Age: 58
Discharge: HOME | End: 2023-04-24
Attending: EMERGENCY MEDICINE
Payer: MEDICARE

## 2023-04-24 VITALS
OXYGEN SATURATION: 99 % | RESPIRATION RATE: 16 BRPM | SYSTOLIC BLOOD PRESSURE: 158 MMHG | HEIGHT: 67 IN | TEMPERATURE: 98 F | WEIGHT: 180 LBS | HEART RATE: 69 BPM | BODY MASS INDEX: 28.25 KG/M2 | DIASTOLIC BLOOD PRESSURE: 82 MMHG

## 2023-04-24 DIAGNOSIS — K85.20 ALCOHOL-INDUCED ACUTE PANCREATITIS WITHOUT INFECTION OR NECROSIS: Primary | ICD-10-CM

## 2023-04-24 DIAGNOSIS — E11.65 POORLY CONTROLLED DIABETES MELLITUS: ICD-10-CM

## 2023-04-24 DIAGNOSIS — R07.9 CHEST PAIN: ICD-10-CM

## 2023-04-24 DIAGNOSIS — R10.84 GENERALIZED ABDOMINAL PAIN: ICD-10-CM

## 2023-04-24 LAB
ALBUMIN SERPL BCP-MCNC: 3 G/DL (ref 3.5–5.2)
ALP SERPL-CCNC: 82 U/L (ref 55–135)
ALT SERPL W/O P-5'-P-CCNC: 44 U/L (ref 10–44)
ANION GAP SERPL CALC-SCNC: 13 MMOL/L (ref 8–16)
AST SERPL-CCNC: 77 U/L (ref 10–40)
BACTERIA #/AREA URNS HPF: NORMAL /HPF
BASOPHILS # BLD AUTO: 0.06 K/UL (ref 0–0.2)
BASOPHILS NFR BLD: 0.6 % (ref 0–1.9)
BILIRUB SERPL-MCNC: 0.8 MG/DL (ref 0.1–1)
BILIRUB UR QL STRIP: NEGATIVE
BNP SERPL-MCNC: 16 PG/ML (ref 0–99)
BUN SERPL-MCNC: 7 MG/DL (ref 6–20)
CALCIUM SERPL-MCNC: 9.2 MG/DL (ref 8.7–10.5)
CHLORIDE SERPL-SCNC: 99 MMOL/L (ref 95–110)
CLARITY UR: CLEAR
CO2 SERPL-SCNC: 19 MMOL/L (ref 23–29)
COLOR UR: YELLOW
CREAT SERPL-MCNC: 1.2 MG/DL (ref 0.5–1.4)
DIFFERENTIAL METHOD: ABNORMAL
EOSINOPHIL # BLD AUTO: 0.1 K/UL (ref 0–0.5)
EOSINOPHIL NFR BLD: 1 % (ref 0–8)
ERYTHROCYTE [DISTWIDTH] IN BLOOD BY AUTOMATED COUNT: 13 % (ref 11.5–14.5)
EST. GFR  (NO RACE VARIABLE): >60 ML/MIN/1.73 M^2
GLUCOSE SERPL-MCNC: 327 MG/DL (ref 70–110)
GLUCOSE UR QL STRIP: ABNORMAL
HCT VFR BLD AUTO: 38.4 % (ref 40–54)
HGB BLD-MCNC: 13.2 G/DL (ref 14–18)
HGB UR QL STRIP: ABNORMAL
IMM GRANULOCYTES # BLD AUTO: 0.04 K/UL (ref 0–0.04)
IMM GRANULOCYTES NFR BLD AUTO: 0.4 % (ref 0–0.5)
KETONES UR QL STRIP: NEGATIVE
LEUKOCYTE ESTERASE UR QL STRIP: NEGATIVE
LIPASE SERPL-CCNC: 159 U/L (ref 4–60)
LYMPHOCYTES # BLD AUTO: 1.7 K/UL (ref 1–4.8)
LYMPHOCYTES NFR BLD: 18.1 % (ref 18–48)
MAGNESIUM SERPL-MCNC: 2 MG/DL (ref 1.6–2.6)
MCH RBC QN AUTO: 27.2 PG (ref 27–31)
MCHC RBC AUTO-ENTMCNC: 34.4 G/DL (ref 32–36)
MCV RBC AUTO: 79 FL (ref 82–98)
MICROSCOPIC COMMENT: NORMAL
MONOCYTES # BLD AUTO: 0.5 K/UL (ref 0.3–1)
MONOCYTES NFR BLD: 5.5 % (ref 4–15)
NEUTROPHILS # BLD AUTO: 7 K/UL (ref 1.8–7.7)
NEUTROPHILS NFR BLD: 74.4 % (ref 38–73)
NITRITE UR QL STRIP: NEGATIVE
NRBC BLD-RTO: 0 /100 WBC
PH UR STRIP: 6 [PH] (ref 5–8)
PLATELET # BLD AUTO: 197 K/UL (ref 150–450)
PMV BLD AUTO: 9.2 FL (ref 9.2–12.9)
POCT GLUCOSE: 257 MG/DL (ref 70–110)
POTASSIUM SERPL-SCNC: 3.9 MMOL/L (ref 3.5–5.1)
PROT SERPL-MCNC: 7.6 G/DL (ref 6–8.4)
PROT UR QL STRIP: ABNORMAL
RBC # BLD AUTO: 4.86 M/UL (ref 4.6–6.2)
RBC #/AREA URNS HPF: 0 /HPF (ref 0–4)
SODIUM SERPL-SCNC: 131 MMOL/L (ref 136–145)
SP GR UR STRIP: 1.02 (ref 1–1.03)
TROPONIN I SERPL DL<=0.01 NG/ML-MCNC: 0.02 NG/ML (ref 0–0.03)
URN SPEC COLLECT METH UR: ABNORMAL
UROBILINOGEN UR STRIP-ACNC: NEGATIVE EU/DL
WBC # BLD AUTO: 9.37 K/UL (ref 3.9–12.7)
YEAST URNS QL MICRO: NORMAL

## 2023-04-24 PROCEDURE — 84484 ASSAY OF TROPONIN QUANT: CPT | Performed by: NURSE PRACTITIONER

## 2023-04-24 PROCEDURE — 80053 COMPREHEN METABOLIC PANEL: CPT | Performed by: NURSE PRACTITIONER

## 2023-04-24 PROCEDURE — 93005 ELECTROCARDIOGRAM TRACING: CPT

## 2023-04-24 PROCEDURE — 63600175 PHARM REV CODE 636 W HCPCS: Performed by: NURSE PRACTITIONER

## 2023-04-24 PROCEDURE — 83690 ASSAY OF LIPASE: CPT | Performed by: NURSE PRACTITIONER

## 2023-04-24 PROCEDURE — 93010 ELECTROCARDIOGRAM REPORT: CPT | Mod: ,,, | Performed by: INTERNAL MEDICINE

## 2023-04-24 PROCEDURE — 93010 EKG 12-LEAD: ICD-10-PCS | Mod: ,,, | Performed by: INTERNAL MEDICINE

## 2023-04-24 PROCEDURE — 83880 ASSAY OF NATRIURETIC PEPTIDE: CPT | Performed by: NURSE PRACTITIONER

## 2023-04-24 PROCEDURE — 96375 TX/PRO/DX INJ NEW DRUG ADDON: CPT

## 2023-04-24 PROCEDURE — 96361 HYDRATE IV INFUSION ADD-ON: CPT

## 2023-04-24 PROCEDURE — 25500020 PHARM REV CODE 255: Performed by: EMERGENCY MEDICINE

## 2023-04-24 PROCEDURE — 81000 URINALYSIS NONAUTO W/SCOPE: CPT | Performed by: NURSE PRACTITIONER

## 2023-04-24 PROCEDURE — 87591 N.GONORRHOEAE DNA AMP PROB: CPT | Performed by: NURSE PRACTITIONER

## 2023-04-24 PROCEDURE — 25000003 PHARM REV CODE 250: Performed by: NURSE PRACTITIONER

## 2023-04-24 PROCEDURE — 83735 ASSAY OF MAGNESIUM: CPT | Performed by: NURSE PRACTITIONER

## 2023-04-24 PROCEDURE — 85025 COMPLETE CBC W/AUTO DIFF WBC: CPT | Performed by: NURSE PRACTITIONER

## 2023-04-24 PROCEDURE — 99285 EMERGENCY DEPT VISIT HI MDM: CPT | Mod: 25

## 2023-04-24 PROCEDURE — 82962 GLUCOSE BLOOD TEST: CPT

## 2023-04-24 PROCEDURE — 96374 THER/PROPH/DIAG INJ IV PUSH: CPT

## 2023-04-24 RX ORDER — MORPHINE SULFATE 4 MG/ML
4 INJECTION, SOLUTION INTRAMUSCULAR; INTRAVENOUS
Status: COMPLETED | OUTPATIENT
Start: 2023-04-24 | End: 2023-04-24

## 2023-04-24 RX ORDER — HYDROCODONE BITARTRATE AND ACETAMINOPHEN 5; 325 MG/1; MG/1
1 TABLET ORAL EVERY 6 HOURS PRN
Qty: 10 TABLET | Refills: 0 | Status: SHIPPED | OUTPATIENT
Start: 2023-04-24

## 2023-04-24 RX ORDER — DIPHENHYDRAMINE HYDROCHLORIDE 50 MG/ML
25 INJECTION INTRAMUSCULAR; INTRAVENOUS
Status: COMPLETED | OUTPATIENT
Start: 2023-04-24 | End: 2023-04-24

## 2023-04-24 RX ADMIN — DIPHENHYDRAMINE HYDROCHLORIDE 25 MG: 50 INJECTION, SOLUTION INTRAMUSCULAR; INTRAVENOUS at 09:04

## 2023-04-24 RX ADMIN — SODIUM CHLORIDE 500 ML: 0.9 INJECTION, SOLUTION INTRAVENOUS at 08:04

## 2023-04-24 RX ADMIN — SODIUM CHLORIDE 1000 ML: 9 INJECTION, SOLUTION INTRAVENOUS at 10:04

## 2023-04-24 RX ADMIN — MORPHINE SULFATE 4 MG: 4 INJECTION, SOLUTION INTRAMUSCULAR; INTRAVENOUS at 10:04

## 2023-04-24 RX ADMIN — IOHEXOL 100 ML: 350 INJECTION, SOLUTION INTRAVENOUS at 09:04

## 2023-04-24 NOTE — ED TRIAGE NOTES
"Pt presents to ED c/o abd pain with constipation onset Friday. Pt states he took metamucil and mag citrate with mild relief and now has diarrhea. Pt also reports midsternal CP "pinching and squeezing" x3 months and R lower gum x "couple months" and testicular pain with knot. Pt states when his gums are hurting, the chest pain begins shortly after. Denies N/V, urinary symptoms.  "

## 2023-04-24 NOTE — ED PROVIDER NOTES
"     Source of History:  Patient    Chief complaint:  multiple complaints (Pt c.o right lower side toothache onset couple months.  Pt also c.o chest pain onset 3 months ago non radiating. Pt denies n/v.  Pt also c.o diffuse abd pain onset Friday with constipation. Pt took metamucil and mag citrate with mild relief. AAO x 3 nadn skin w.d )      HPI:  Yoni Horne III is a 57 y.o. male HTN, T2DM, HIV (viral load undetectable), seizures, esophageal stricture, pancreatitis, heart failure and HPV presenting with multiple complaint.  Patient is complaining of abdominal pain since Friday.  Patient states that he feels like he has been constipated.  He has taken Metamucil Mag citrate and other laxatives without a significant amount of relief.  Today he started having diarrhea and as he did not want a poop on himself in the car took multiple Imodium prior to presenting to the emergency room.  Now his stomach hurts worse.  It hurts all over and he feels like he is swollen on the left side of his abdomen.  He denies recent excessive alcohol consumption.  He denies nausea, vomiting and blood in his stool.  Also denies rectal pain.  Patient is also complaining right-sided chest pain that he describes as feeling like someone's gripping him on the right side of his chest.  This has been intermittent and ongoing for about 3 months.  He has not determined a pattern specifically does not occur after eating.  States that he does not choke on his food and his esophageal stricture has been well-maintained and he eats well.  He is reporting right-sided gum pain.  Denies dental pain however.  States that whenever his gums start her he knows that the chest pain is going to follow shortly..  Patient is also complaining of testicular pain and a "knot on my balls" that he noticed this weekend.  He also states that he has a burning sensation when he pees.  He is not concern for STDs stating that he is had the same partner for 12 years.  " "Patient states that this weekend he was in way more pain and discomfort and could not make it to the hospital to be evaluated.  He states that he is still very uncomfortable but has improved since Friday.    This is the extent to the patients complaints today here in the emergency department.    ROS: As per HPI and below:  Per HPI    Review of patient's allergies indicates:   Allergen Reactions    Iodine and iodide containing products     Shellfish containing products        PMH:  As per HPI and below:  Past Medical History:   Diagnosis Date    HIV (human immunodeficiency virus infection)     Hypertension     Immune deficiency disorder     Seizures      History reviewed. No pertinent surgical history.    Social History     Tobacco Use    Smoking status: Never    Smokeless tobacco: Never   Substance Use Topics    Alcohol use: Yes     Alcohol/week: 30.0 standard drinks     Types: 30 Cans of beer per week     Comment: Pt reports drinking about 1 6 pack of beer daily.     Drug use: No       Physical Exam:    BP (!) 158/82   Pulse 69   Temp 98.1 °F (36.7 °C) (Oral)   Resp 16   Ht 5' 7" (1.702 m)   Wt 81.6 kg (180 lb)   SpO2 99%   BMI 28.19 kg/m²   Nursing note and vital signs reviewed.  Appearance: No acute distress.  Eyes: No conjunctival injection.  ENT: Oropharynx clear.  Poor dentition, missing upper teeth.  No palpable or visual abscess.  No tenderness palpation of the vestibule.  No trismus.  Uvula midline.  No sublingual elevation    Chest/ Respiratory: Clear to auscultation bilaterally.  Good air movement.  No wheezes.  No rhonchi. No rales. No accessory muscle use.  Cardiovascular: Regular rate and rhythm.  No murmurs. No gallops. No rubs.  Abdomen: Soft.  Distended.  Diffusely tender, most significant in left upper quadrant.  No guarding.  No rebound. Non-peritoneal.  :  Exam done in the presence of chaperone Dl GIBBS.  Uncircumcised male.  No penile lesions or drainage noted.  No scrotal edema.  No " testicular tenderness or mass noted.  Musculoskeletal: Good range of motion all joints.  No deformities.  Neck supple.  No meningismus.  Skin: No rashes seen.  Good turgor.  No abrasions.  No ecchymoses.  Neurologic: Motor intact.  Sensation intact.  Cerebellar intact.  Cranial nerves intact.  Mental Status:  Alert and oriented x 3.  Appropriate, conversant    Labs that have been ordered have been independently reviewed and interpreted by myself.    I decided to obtain the patient's medical records.  Summary of Medical Records:  · EKG: NSR with LVH and diffuse nonspecific T wave inversion; no old infarct seen  · Echo January 2018: Normal LV size and function; no valvular pathology noted  · Dobutamine Echo January 2018: Negative for ischemia at a diagnostic heart rate  · R/LHC/cors August 2018: Premedicated for Iodine allergy; PASP 35, PCWP 22, nonobstructive cors, transfemoral approach  · Lipids June 2020: , , LDL NC, HDL 48  · Lipids April 2021: , , LDL NC, HDL 41    Labs Reviewed   CBC W/ AUTO DIFFERENTIAL - Abnormal; Notable for the following components:       Result Value    Hemoglobin 13.2 (*)     Hematocrit 38.4 (*)     MCV 79 (*)     Gran % 74.4 (*)     All other components within normal limits   COMPREHENSIVE METABOLIC PANEL - Abnormal; Notable for the following components:    Sodium 131 (*)     CO2 19 (*)     Glucose 327 (*)     Albumin 3.0 (*)     AST 77 (*)     All other components within normal limits   LIPASE - Abnormal; Notable for the following components:    Lipase 159 (*)     All other components within normal limits   URINALYSIS, REFLEX TO URINE CULTURE - Abnormal; Notable for the following components:    Protein, UA Trace (*)     Glucose, UA 4+ (*)     Occult Blood UA Trace (*)     All other components within normal limits    Narrative:     Specimen Source->Urine   POCT GLUCOSE - Abnormal; Notable for the following components:    POCT Glucose 257 (*)     All other  components within normal limits   C. TRACHOMATIS/N. GONORRHOEAE BY AMP DNA   MAGNESIUM   TROPONIN I   B-TYPE NATRIURETIC PEPTIDE   URINALYSIS MICROSCOPIC    Narrative:     Specimen Source->Urine       Imaging Results               CT Abdomen Pelvis With Contrast (Final result)  Result time 04/24/23 10:08:35      Final result by Lilibeth Mary MD (04/24/23 10:08:35)                   Impression:      Inflammatory stranding and fluid along the pancreas suspicious for acute pancreatitis.  Please correlate with laboratory values.    Continued diffuse soft tissue thickening of the distal esophagus.  Correlation with EGD advised.    This report was flagged in Epic as abnormal.      Electronically signed by: Lilibeth Mary  Date:    04/24/2023  Time:    10:08               Narrative:    EXAMINATION:  CT ABDOMEN PELVIS WITH CONTRAST    CLINICAL HISTORY:  Abdominal pain, acute, nonlocalized;    TECHNIQUE:  Low dose axial images, sagittal and coronal reformations were obtained from the lung bases to the pubic symphysis following the IV administration of 100 mL of Omnipaque 350.    COMPARISON:  11/07/2022    FINDINGS:  The lung bases show subsegmental bibasilar atelectasis or scarring.    The liver is homogeneous.  Gallbladder is contracted.  Spleen is normal size.  Diffuse inflammatory stranding surrounds the body and tail of the pancreas.  Fluid extends inferiorly with no focal fluid collection.    Adrenal glands are normal.  Kidneys concentrate contrast appropriately.  The urinary bladder is unremarkable.    Aorta is normal caliber.  No retroperitoneal or mesenteric lymph node enlargement seen.    Stomach and loops of bowel are normal caliber.  Visualized appendix is normal.  No significant free fluid in the pelvis.    Regional skeleton shows degenerative change                                       US Scrotum And Testicles (Final result)  Result time 04/24/23 09:43:39      Final result by Lilibeth Mary MD  (04/24/23 09:43:39)                   Impression:      Small left hydrocele.  No acute process seen.      Electronically signed by: Lilibeth Tyra  Date:    04/24/2023  Time:    09:43               Narrative:    EXAMINATION:  US SCROTUM AND TESTICLES    CLINICAL HISTORY:  testicle pain;    TECHNIQUE:  Sonography of the scrotum and testes.    COMPARISON:  None.    FINDINGS:  Right Testicle:    *Size: 4.1 x 3.3 x 2.6 cm  *Appearance: Normal.  *Flow: Normal arterial and venous flow  *Epididymis: Normal.  *Hydrocele: None.  *Varicocele: None.  .    Left Testicle:    *Size: 4 x 2.3 x 2.9 cm  *Appearance: Homogeneous.  Tiny simple cyst along the periphery measuring 0.3 cm as can be seen with a tunic albuginea cyst.  *Flow: Normal arterial and venous flow  *Epididymis: Normal.  *Hydrocele: Small.  *Varicocele: None.  Other findings: None.                                      Initial Impression/ Differential Dx:  Urgent evaluation of 57 y.o. male presenting with wide variety of complaints including chest pain, abdominal pain, dental pain and testicular. Patient is afebrile, not toxic appearing and hemodynamically stable.  On exam abdomen is distended with diffuse tenderness most significant in left upper quadrant with what appears to be mild edema.  Given history, concerning for pancreatitis.  Plan for labs and CT abdomen pelvis.  Patient has reported contrast allergy but received a CT with contrast that difficulty in November.  Will premedicate with Benadryl and give fluids.  Chest pain has been ongoing for months.  Low suspicion but plan for ACS rule out.  No evidence of dental abscess or broken tooth. Without fever, odynophagia, neck edema, trismus, or signs of respiratory distress, low suspicion for deep space infection.  Do not believe patient warrants antibiotic treatment at this time.  He reports the toothache signals him that the chest pain is going to occur.  Regarding testicular pain, no abnormalities addressed  noted on my exam, however will get testicular ultrasound.      Differential Diagnosis includes, but is not limited to:  ACS/MI, PE, aortic dissection, pneumothorax, cardiac tamponade, pericarditis/myocarditis, pneumonia, infection/abscess, lung mass, trauma/fracture, costochondritis/pleurisy, MSK pain/contusion, GERD, biliary disease, pancreatitis, anemia  Differential Diagnosis includes, but is not limited to:  AAA, aortic dissection, mesenteric ischemia, perforated viscous, MI/ACS, SBO/volvulus, incarcerated/strangulated hernia, intussusception, ileus, appendicitis, cholecystitis, cholangitis, diverticulitis, esophagitis, hepatitis, nephrolithiasis, pancreatitis, gastroenteritis, colitis, IBD/IBS, biliary colic, GERD, PUD, constipation, UTI/pyelonephritis,  disorder.  Differential Diagnosis includes, but is not limited to:  STI, urethritis, testicular/appendix torsion, epididymitis, orchitis, UTI, prostatitis, testicular mass/neoplasm, hydrocele, varicocele      MDM:        ED Course as of 04/24/23 1338   Mon Apr 24, 2023   0857 EKG independently interpreted by myself shows normal sinus rhythm at a rate of 81 beats per minute, normal intervals, no STEMI, ST and T-wave abnormalities, however morphology grossly unchanged from prior EKG from November of 2022 [CU]   0857 Urinalysis, Reflex to Urine Culture Urine, Clean Catch(!)  Glycosuria and trace proteins related likely to orally controlled diabetes [CU]   0858 Urinalysis Microscopic  No evidence of infection pyuria or yeast [CU]   0933 CBC auto differential(!)  No leukocytosis, mild anemia [CU]   0934 Comprehensive metabolic panel(!)  Sodium 131 however sugar 327, corrected sodium 135, no other significant electrolyte abnormalities, normal kidney function, mild elevation of AST [CU]   0935 Lipase(!): 159  Elevated, not quite 3x normal to indicate acute pancreatitis [CU]   0936 BNP: 16 [CU]   0936 Troponin I: 0.021 [CU]   0936 Magnesium: 2.0 [CU]   0957 US  Scrotum And Testicles  Small left hydrocele likely the sources of discomfort.  No acute process seen. [CU]   1011 CT Abdomen Pelvis With Contrast(!)  Inflammatory stranding and fluid along the pancreas suspicious for acute pancreatitis.  Lipase 159     Continued diffuse soft tissue thickening of the distal esophagus.  Known condition. [CU]   1020 At bedside to discuss lab and imaging results.  Patient states that he is still drinking however has not had an alcoholic drink since Friday.  Regarding blood sugar, patient states that he sometimes takes his diabetes medications, most often skipping his insulin.  Counseled patient on the importance of medication compliance.  Patient is still complaining of pain.  Will give morphine and more IV fluids.  Offered patient admission for pain control and continued monitoring.  States that he does not want to stay in the hospital.  Will attempt pain control and recheck BG after IV fluids [CU]   1126 At bedside to reassess patient.  He reports significant improvement in his pain.  Offered patient admission for continued IV fluids, pain control and GI consult but the patient declines observation at this time.  Counseled patient to maintain a clear liquid diet and will discharge home with a few pain meds.  Counseled him to follow-up with his gastroenterologist within the week. Also counseled patient on the importance of abstaining from drinking alcohol or pancreatitis may continue to occur. Patient educated on signs and symptoms to monitor for and when to return to ED. Patient verbalized understanding agrees with treatment plan. All questions and concerns addressed.      [CU]   1139 POCT Glucose(!): 257  Counseled patient on the importance of taking his diabetes medications and following up with endocrinology. [CU]      ED Course User Index  [CU] Peterson Morris NP                   Diagnostic Impression:    1. Alcohol-induced acute pancreatitis without infection or necrosis    2.  Chest pain    3. Poorly controlled diabetes mellitus    4. Generalized abdominal pain         ED Disposition Condition    Discharge Good            ED Prescriptions       Medication Sig Dispense Start Date End Date Auth. Provider    HYDROcodone-acetaminophen (NORCO) 5-325 mg per tablet Take 1 tablet by mouth every 6 (six) hours as needed for Pain. 10 tablet 4/24/2023 -- Peterson Morris NP          Follow-up Information       Follow up With Specialties Details Why Contact Info    your gastroenterologist  In 2 days               Peterson Morris NP  04/24/23 1902

## 2023-04-26 LAB
C TRACH DNA SPEC QL NAA+PROBE: NOT DETECTED
N GONORRHOEA DNA SPEC QL NAA+PROBE: NOT DETECTED

## 2023-09-14 ENCOUNTER — HOSPITAL ENCOUNTER (EMERGENCY)
Facility: OTHER | Age: 58
Discharge: HOME | End: 2023-09-14
Attending: EMERGENCY MEDICINE
Payer: MEDICARE

## 2023-09-14 VITALS
TEMPERATURE: 98 F | HEART RATE: 84 BPM | OXYGEN SATURATION: 95 % | SYSTOLIC BLOOD PRESSURE: 142 MMHG | HEIGHT: 67 IN | BODY MASS INDEX: 29.03 KG/M2 | WEIGHT: 185 LBS | DIASTOLIC BLOOD PRESSURE: 95 MMHG | RESPIRATION RATE: 15 BRPM

## 2023-09-14 DIAGNOSIS — K85.90 ACUTE ON CHRONIC PANCREATITIS: Primary | ICD-10-CM

## 2023-09-14 DIAGNOSIS — F10.10 ALCOHOL ABUSE: ICD-10-CM

## 2023-09-14 DIAGNOSIS — K86.1 ACUTE ON CHRONIC PANCREATITIS: Primary | ICD-10-CM

## 2023-09-14 LAB
ALBUMIN SERPL BCP-MCNC: 3.9 G/DL (ref 3.5–5.2)
ALP SERPL-CCNC: 77 U/L (ref 55–135)
ALT SERPL W/O P-5'-P-CCNC: 20 U/L (ref 10–44)
ANION GAP SERPL CALC-SCNC: 12 MMOL/L (ref 8–16)
AST SERPL-CCNC: 22 U/L (ref 10–40)
BASOPHILS # BLD AUTO: 0.06 K/UL (ref 0–0.2)
BASOPHILS NFR BLD: 0.8 % (ref 0–1.9)
BILIRUB SERPL-MCNC: 0.7 MG/DL (ref 0.1–1)
BUN SERPL-MCNC: 13 MG/DL (ref 6–20)
CALCIUM SERPL-MCNC: 9.2 MG/DL (ref 8.7–10.5)
CHLORIDE SERPL-SCNC: 103 MMOL/L (ref 95–110)
CO2 SERPL-SCNC: 20 MMOL/L (ref 23–29)
CREAT SERPL-MCNC: 1.2 MG/DL (ref 0.5–1.4)
DIFFERENTIAL METHOD: ABNORMAL
EOSINOPHIL # BLD AUTO: 0.1 K/UL (ref 0–0.5)
EOSINOPHIL NFR BLD: 1.1 % (ref 0–8)
ERYTHROCYTE [DISTWIDTH] IN BLOOD BY AUTOMATED COUNT: 14.3 % (ref 11.5–14.5)
EST. GFR  (NO RACE VARIABLE): >60 ML/MIN/1.73 M^2
GLUCOSE SERPL-MCNC: 153 MG/DL (ref 70–110)
HCT VFR BLD AUTO: 50.3 % (ref 40–54)
HCV AB SERPL QL IA: NEGATIVE
HGB BLD-MCNC: 16.9 G/DL (ref 14–18)
IMM GRANULOCYTES # BLD AUTO: 0.03 K/UL (ref 0–0.04)
IMM GRANULOCYTES NFR BLD AUTO: 0.4 % (ref 0–0.5)
LIPASE SERPL-CCNC: 44 U/L (ref 4–60)
LYMPHOCYTES # BLD AUTO: 2.1 K/UL (ref 1–4.8)
LYMPHOCYTES NFR BLD: 29.1 % (ref 18–48)
MCH RBC QN AUTO: 26.3 PG (ref 27–31)
MCHC RBC AUTO-ENTMCNC: 33.6 G/DL (ref 32–36)
MCV RBC AUTO: 78 FL (ref 82–98)
MONOCYTES # BLD AUTO: 0.5 K/UL (ref 0.3–1)
MONOCYTES NFR BLD: 7.1 % (ref 4–15)
NEUTROPHILS # BLD AUTO: 4.4 K/UL (ref 1.8–7.7)
NEUTROPHILS NFR BLD: 61.5 % (ref 38–73)
NRBC BLD-RTO: 0 /100 WBC
PLATELET # BLD AUTO: 170 K/UL (ref 150–450)
PMV BLD AUTO: 8.7 FL (ref 9.2–12.9)
POTASSIUM SERPL-SCNC: 4.2 MMOL/L (ref 3.5–5.1)
PROT SERPL-MCNC: 7.6 G/DL (ref 6–8.4)
RBC # BLD AUTO: 6.43 M/UL (ref 4.6–6.2)
SODIUM SERPL-SCNC: 135 MMOL/L (ref 136–145)
WBC # BLD AUTO: 7.21 K/UL (ref 3.9–12.7)

## 2023-09-14 PROCEDURE — 63600175 PHARM REV CODE 636 W HCPCS: Performed by: EMERGENCY MEDICINE

## 2023-09-14 PROCEDURE — 96375 TX/PRO/DX INJ NEW DRUG ADDON: CPT

## 2023-09-14 PROCEDURE — 80053 COMPREHEN METABOLIC PANEL: CPT | Performed by: EMERGENCY MEDICINE

## 2023-09-14 PROCEDURE — 96361 HYDRATE IV INFUSION ADD-ON: CPT

## 2023-09-14 PROCEDURE — 83690 ASSAY OF LIPASE: CPT | Performed by: EMERGENCY MEDICINE

## 2023-09-14 PROCEDURE — 25000003 PHARM REV CODE 250: Performed by: EMERGENCY MEDICINE

## 2023-09-14 PROCEDURE — 86803 HEPATITIS C AB TEST: CPT | Performed by: EMERGENCY MEDICINE

## 2023-09-14 PROCEDURE — 96374 THER/PROPH/DIAG INJ IV PUSH: CPT

## 2023-09-14 PROCEDURE — 85025 COMPLETE CBC W/AUTO DIFF WBC: CPT | Performed by: EMERGENCY MEDICINE

## 2023-09-14 PROCEDURE — 99284 EMERGENCY DEPT VISIT MOD MDM: CPT | Mod: 25

## 2023-09-14 RX ORDER — ONDANSETRON 4 MG/1
4 TABLET, ORALLY DISINTEGRATING ORAL EVERY 6 HOURS PRN
Qty: 15 TABLET | Refills: 0 | Status: SHIPPED | OUTPATIENT
Start: 2023-09-14

## 2023-09-14 RX ORDER — MAG HYDROX/ALUMINUM HYD/SIMETH 200-200-20
30 SUSPENSION, ORAL (FINAL DOSE FORM) ORAL ONCE
Status: COMPLETED | OUTPATIENT
Start: 2023-09-14 | End: 2023-09-14

## 2023-09-14 RX ORDER — TRAMADOL HYDROCHLORIDE 50 MG/1
50 TABLET ORAL EVERY 6 HOURS PRN
Qty: 12 TABLET | Refills: 0 | Status: SHIPPED | OUTPATIENT
Start: 2023-09-14

## 2023-09-14 RX ORDER — HYDROMORPHONE HYDROCHLORIDE 1 MG/ML
1 INJECTION, SOLUTION INTRAMUSCULAR; INTRAVENOUS; SUBCUTANEOUS
Status: COMPLETED | OUTPATIENT
Start: 2023-09-14 | End: 2023-09-14

## 2023-09-14 RX ORDER — KETOROLAC TROMETHAMINE 30 MG/ML
10 INJECTION, SOLUTION INTRAMUSCULAR; INTRAVENOUS
Status: COMPLETED | OUTPATIENT
Start: 2023-09-14 | End: 2023-09-14

## 2023-09-14 RX ORDER — ONDANSETRON 2 MG/ML
4 INJECTION INTRAMUSCULAR; INTRAVENOUS
Status: COMPLETED | OUTPATIENT
Start: 2023-09-14 | End: 2023-09-14

## 2023-09-14 RX ORDER — LIDOCAINE HYDROCHLORIDE 20 MG/ML
15 SOLUTION OROPHARYNGEAL ONCE
Status: COMPLETED | OUTPATIENT
Start: 2023-09-14 | End: 2023-09-14

## 2023-09-14 RX ADMIN — ALUMINUM HYDROXIDE, MAGNESIUM HYDROXIDE, AND SIMETHICONE 30 ML: 200; 200; 20 SUSPENSION ORAL at 11:09

## 2023-09-14 RX ADMIN — ONDANSETRON 4 MG: 2 INJECTION INTRAMUSCULAR; INTRAVENOUS at 09:09

## 2023-09-14 RX ADMIN — LIDOCAINE HYDROCHLORIDE 15 ML: 20 SOLUTION OROPHARYNGEAL at 11:09

## 2023-09-14 RX ADMIN — HYDROMORPHONE HYDROCHLORIDE 1 MG: 1 INJECTION, SOLUTION INTRAMUSCULAR; INTRAVENOUS; SUBCUTANEOUS at 09:09

## 2023-09-14 RX ADMIN — SODIUM CHLORIDE 1000 ML: 9 INJECTION, SOLUTION INTRAVENOUS at 09:09

## 2023-09-14 RX ADMIN — KETOROLAC TROMETHAMINE 10 MG: 30 INJECTION, SOLUTION INTRAMUSCULAR; INTRAVENOUS at 11:09

## 2023-09-14 NOTE — ED TRIAGE NOTES
Pt with history of alcoholism and pancreatitis presenting with abdominal pain. States it started hurting Monday and so he stopped drinking on Tuesday. Usually drinks 1 6 pack per day per pt. Alert and oriented, in no acute distress. States he feels somewhat anxious but otherwise normal for CIWA.

## 2023-09-14 NOTE — ED PROVIDER NOTES
"Encounter Date: 9/14/2023    SCRIBE #1 NOTE: I, Milad Stoddard, am scribing for, and in the presence of,  Clemente Pryor II, MD. I have scribed the following portions of the note - Other sections scribed: HPI, ROS, PE.       History     Chief Complaint   Patient presents with    Abdominal Pain     Reports lower abd pain onset last night. States "it's my pancreas, it's hurting". Hx of acute pancreatitis. Last drank two days ago. Denies N/V.      Time seen by provider: 8:41 AM    This is a 58 y.o. male, with history of alcohol abuse at one six-pack of beer per day, who presents with complaint of lower abdominal pain consistent with his prior episodes of pancreatitis. His pain has been gradually worsening for the past week and he ceased all alcohol consumption three days ago. He denies any distension, nausea, or vomiting. PSHx of cholecystectomy and regular esophageal dilations. No SHx of tobacco or illicit drug use. This is the extent of the patient's complaints at this time.    The history is provided by the patient.     Review of patient's allergies indicates:   Allergen Reactions    Iodine and iodide containing products     Shellfish containing products      Past Medical History:   Diagnosis Date    HIV (human immunodeficiency virus infection)     Hypertension     Immune deficiency disorder     Seizures      No past surgical history on file.  Family History   Problem Relation Age of Onset    Cancer Father      Social History     Tobacco Use    Smoking status: Never    Smokeless tobacco: Never   Substance Use Topics    Alcohol use: Yes     Alcohol/week: 30.0 standard drinks of alcohol     Types: 30 Cans of beer per week     Comment: Pt reports drinking about 1 6 pack of beer daily.     Drug use: No     Review of Systems   Constitutional:  Negative for fever.   HENT:  Negative for sore throat.    Respiratory:  Negative for shortness of breath.    Cardiovascular:  Negative for chest pain.   Gastrointestinal:  " Positive for abdominal pain. Negative for nausea.   Genitourinary:  Negative for dysuria.   Musculoskeletal:  Negative for back pain.   Skin:  Negative for rash.   Neurological:  Negative for weakness.   Hematological:  Does not bruise/bleed easily.       Physical Exam     Initial Vitals [09/14/23 0812]   BP Pulse Resp Temp SpO2   (!) 157/95 82 20 97.5 °F (36.4 °C) 95 %      MAP       --         Physical Exam    Nursing note and vitals reviewed.  Constitutional: He appears well-developed and well-nourished.   Uncomfortable and in moderate distress secondary to pain.    HENT:   Head: Normocephalic.   Dry mucous membranes.    Eyes: Conjunctivae are normal.   No pallor or icterus.    Neck: Neck supple.   Abdominal: Abdomen is soft. He exhibits no distension.   Tenderness to the upper abdomen.  There is no rebound, no guarding and negative Kline's sign.   Musculoskeletal:         General: No edema.      Cervical back: Neck supple.     Neurological: He is alert and oriented to person, place, and time.   Skin: Skin is warm and dry.   Psychiatric: He has a normal mood and affect.         ED Course   Procedures  Labs Reviewed   CBC W/ AUTO DIFFERENTIAL - Abnormal; Notable for the following components:       Result Value    RBC 6.43 (*)     MCV 78 (*)     MCH 26.3 (*)     MPV 8.7 (*)     All other components within normal limits    Narrative:     Release to patient->Immediate   COMPREHENSIVE METABOLIC PANEL - Abnormal; Notable for the following components:    Sodium 135 (*)     CO2 20 (*)     Glucose 153 (*)     All other components within normal limits    Narrative:     Release to patient->Immediate   HEPATITIS C ANTIBODY    Narrative:     Release to patient->Immediate   LIPASE    Narrative:     Release to patient->Immediate          Imaging Results    None          Medications   sodium chloride 0.9% bolus 1,000 mL 1,000 mL (0 mLs Intravenous Stopped 9/14/23 1043)   ondansetron injection 4 mg (4 mg Intravenous Given  9/14/23 0916)   HYDROmorphone injection 1 mg (1 mg Intravenous Given 9/14/23 0916)   ketorolac injection 9.999 mg (9.999 mg Intravenous Given 9/14/23 1104)   aluminum-magnesium hydroxide-simethicone 200-200-20 mg/5 mL suspension 30 mL (30 mLs Oral Given 9/14/23 1108)     And   LIDOcaine HCl 2% oral solution 15 mL (15 mLs Oral Given 9/14/23 1108)     Medical Decision Making  Amount and/or Complexity of Data Reviewed  Labs: ordered. Decision-making details documented in ED Course.    Risk  OTC drugs.  Prescription drug management.    Patient with a history of chronic alcohol induced pancreatitis presents with pain in the upper abdomen for the past several days.  He had been drinking heavily until he started having the pain.  Nausea but no vomiting.  On exam he initially is fairly uncomfortable appearing, but does not have an acute abdomen.  Appears dehydrated but with stable vital signs.  IV fluid repletion begun, provided analgesics.  Laboratory studies are reassuring with normal white count, trace hyponatremia but normal renal function transaminases bilirubin and lipase.  Patient is feeling better after above interventions.  Feels comfortable with discharge.  Stressed the need to abstain from alcohol, will provide with list alcohol resources, encouraged to attend rehab program.  Encouraged follow-up with primary care, especially if symptoms persist.  Return precautions discussed for the interim.        Scribe Attestation:   Scribe #1: I performed the above scribed service and the documentation accurately describes the services I performed. I attest to the accuracy of the note.    Physician Attestation for Scribe: I, TL, reviewed documentation as scribed in my presence, which is both accurate and complete.                        Clinical Impression:   Final diagnoses:  [K85.90, K86.1] Acute on chronic pancreatitis (Primary)  [F10.10] Alcohol abuse        ED Disposition Condition    Discharge Stable          ED  Prescriptions       Medication Sig Dispense Start Date End Date Auth. Provider    traMADoL (ULTRAM) 50 mg tablet Take 1 tablet (50 mg total) by mouth every 6 (six) hours as needed for Pain. 12 tablet 9/14/2023 -- Clemente Pryor II, MD    ondansetron (ZOFRAN-ODT) 4 MG TbDL Take 1 tablet (4 mg total) by mouth every 6 (six) hours as needed. 15 tablet 9/14/2023 -- Clemente Pryor II, MD          Follow-up Information       Follow up With Specialties Details Why Contact Info    Primary Care Clinic  Schedule an appointment as soon as possible for a visit in 5 days      [facility]  [place]  In 5 days  [email or web address] [facility] [address]  [phone]               Clemente Pryor II, MD  09/15/23 0608

## 2024-11-05 ENCOUNTER — HOSPITAL ENCOUNTER (EMERGENCY)
Facility: OTHER | Age: 59
Discharge: HOME | End: 2024-11-05
Attending: EMERGENCY MEDICINE
Payer: MEDICARE

## 2024-11-05 VITALS
HEIGHT: 67 IN | TEMPERATURE: 98 F | SYSTOLIC BLOOD PRESSURE: 117 MMHG | OXYGEN SATURATION: 96 % | DIASTOLIC BLOOD PRESSURE: 71 MMHG | RESPIRATION RATE: 17 BRPM | BODY MASS INDEX: 28.25 KG/M2 | HEART RATE: 78 BPM | WEIGHT: 180 LBS

## 2024-11-05 DIAGNOSIS — M54.50 ACUTE BILATERAL LOW BACK PAIN WITHOUT SCIATICA: Primary | ICD-10-CM

## 2024-11-05 LAB
BACTERIA #/AREA URNS HPF: NORMAL /HPF
BILIRUB UR QL STRIP: NEGATIVE
CLARITY UR: CLEAR
COLOR UR: COLORLESS
GLUCOSE UR QL STRIP: ABNORMAL
HCV AB SERPL QL IA: NEGATIVE
HGB UR QL STRIP: NEGATIVE
KETONES UR QL STRIP: NEGATIVE
LEUKOCYTE ESTERASE UR QL STRIP: NEGATIVE
MICROSCOPIC COMMENT: NORMAL
NITRITE UR QL STRIP: NEGATIVE
PH UR STRIP: 6 [PH] (ref 5–8)
PROT UR QL STRIP: NEGATIVE
RBC #/AREA URNS HPF: 0 /HPF (ref 0–4)
SP GR UR STRIP: <1.005 (ref 1–1.03)
SQUAMOUS #/AREA URNS HPF: 0 /HPF
URN SPEC COLLECT METH UR: ABNORMAL
UROBILINOGEN UR STRIP-ACNC: NEGATIVE EU/DL
WBC #/AREA URNS HPF: 2 /HPF (ref 0–5)
YEAST URNS QL MICRO: NORMAL

## 2024-11-05 PROCEDURE — 25000003 PHARM REV CODE 250: Performed by: EMERGENCY MEDICINE

## 2024-11-05 PROCEDURE — 96372 THER/PROPH/DIAG INJ SC/IM: CPT | Performed by: EMERGENCY MEDICINE

## 2024-11-05 PROCEDURE — 63600175 PHARM REV CODE 636 W HCPCS: Performed by: EMERGENCY MEDICINE

## 2024-11-05 PROCEDURE — 99284 EMERGENCY DEPT VISIT MOD MDM: CPT | Mod: 25

## 2024-11-05 PROCEDURE — 86803 HEPATITIS C AB TEST: CPT | Performed by: EMERGENCY MEDICINE

## 2024-11-05 PROCEDURE — 81000 URINALYSIS NONAUTO W/SCOPE: CPT | Performed by: EMERGENCY MEDICINE

## 2024-11-05 RX ORDER — KETOROLAC TROMETHAMINE 30 MG/ML
30 INJECTION, SOLUTION INTRAMUSCULAR; INTRAVENOUS
Status: COMPLETED | OUTPATIENT
Start: 2024-11-05 | End: 2024-11-05

## 2024-11-05 RX ORDER — ACETAMINOPHEN 325 MG/1
650 TABLET ORAL
Status: COMPLETED | OUTPATIENT
Start: 2024-11-05 | End: 2024-11-05

## 2024-11-05 RX ORDER — OXYCODONE AND ACETAMINOPHEN 5; 325 MG/1; MG/1
1 TABLET ORAL EVERY 4 HOURS PRN
Qty: 12 TABLET | Refills: 0 | Status: SHIPPED | OUTPATIENT
Start: 2024-11-05

## 2024-11-05 RX ORDER — IBUPROFEN 600 MG/1
600 TABLET ORAL EVERY 6 HOURS PRN
Qty: 20 TABLET | Refills: 0 | Status: SHIPPED | OUTPATIENT
Start: 2024-11-05

## 2024-11-05 RX ORDER — HYDROCODONE BITARTRATE AND ACETAMINOPHEN 5; 325 MG/1; MG/1
2 TABLET ORAL
Status: DISCONTINUED | OUTPATIENT
Start: 2024-11-05 | End: 2024-11-05

## 2024-11-05 RX ADMIN — ACETAMINOPHEN 650 MG: 325 TABLET, FILM COATED ORAL at 12:11

## 2024-11-05 RX ADMIN — KETOROLAC TROMETHAMINE 30 MG: 30 INJECTION, SOLUTION INTRAMUSCULAR; INTRAVENOUS at 11:11

## 2024-11-05 NOTE — ED PROVIDER NOTES
Encounter Date: 11/5/2024       History     Chief Complaint   Patient presents with    Back Pain     Non traumatic lower back pain x 2 weeks.      59-year-old male with history of HIV and lumbar stenosis and chronic back pain presents with complaint of low back pain.  He denies any known inciting event or preceding trauma, stating pain feels similar to previous episodes of back pain.  He denies any saddle anesthesias or paresthesias, denies any urinary or fecal incontinence or retention.  Pain has been unrelieved with tramadol which he has taken at home.  Pain is currently rated 8/10, located in the low back along the midline.        Review of patient's allergies indicates:   Allergen Reactions    Iodine and iodide containing products     Shellfish containing products      Past Medical History:   Diagnosis Date    HIV (human immunodeficiency virus infection)     Hypertension     Immune deficiency disorder     Seizures      History reviewed. No pertinent surgical history.  Family History   Problem Relation Name Age of Onset    Cancer Father       Social History     Tobacco Use    Smoking status: Never    Smokeless tobacco: Never   Substance Use Topics    Alcohol use: Yes     Alcohol/week: 30.0 standard drinks of alcohol     Types: 30 Cans of beer per week     Comment: Pt reports drinking about 1 6 pack of beer daily.     Drug use: No     Review of Systems   Constitutional:  Negative for chills and fever.   HENT:  Negative for congestion and sore throat.    Eyes:  Negative for visual disturbance.   Respiratory:  Negative for cough and shortness of breath.    Cardiovascular:  Negative for chest pain and palpitations.   Gastrointestinal:  Negative for abdominal pain, diarrhea and vomiting.   Genitourinary:  Negative for decreased urine volume, dysuria and frequency.   Musculoskeletal:  Positive for back pain. Negative for joint swelling, neck pain and neck stiffness.   Skin:  Negative for rash and wound.   Neurological:   Negative for weakness, numbness and headaches.   Psychiatric/Behavioral:  Negative for behavioral problems and confusion.        Physical Exam     Initial Vitals [11/05/24 1005]   BP Pulse Resp Temp SpO2   (!) 150/83 82 16 97.3 °F (36.3 °C) 96 %      MAP       --         Physical Exam    Constitutional: He appears well-developed and well-nourished.   HENT:   Head: Normocephalic and atraumatic.   Nose: Nose normal. Mouth/Throat: Oropharynx is clear and moist.   Eyes: Conjunctivae and EOM are normal. Pupils are equal, round, and reactive to light.   Neck: Neck supple.   Normal range of motion.  Cardiovascular:  Normal rate and regular rhythm.     Exam reveals no gallop and no friction rub.       No murmur heard.  Pulmonary/Chest: Breath sounds normal. No respiratory distress. He has no wheezes. He has no rales.   Abdominal: Abdomen is soft and protuberant. Bowel sounds are normal. There is no abdominal tenderness. There is no rebound and no guarding.   Musculoskeletal:         General: Tenderness (Tenderness to palpation over lower lumbar spine and sacrum, bilateral lumbar paraspinal muscular tenderness) present. No edema.      Cervical back: Normal range of motion and neck supple.     Neurological: He is alert and oriented to person, place, and time. He has normal strength. He displays normal reflexes (No clonus). No cranial nerve deficit or sensory deficit. Gait normal. GCS score is 15. GCS eye subscore is 4. GCS verbal subscore is 5. GCS motor subscore is 6.   Skin: Skin is warm and dry. No rash noted.   Psychiatric: He has a normal mood and affect. His speech is normal and behavior is normal.       ED Course   Procedures  Labs Reviewed   URINALYSIS, REFLEX TO URINE CULTURE - Abnormal       Result Value    Specimen UA Urine, Clean Catch      Color, UA Colorless (*)     Appearance, UA Clear      pH, UA 6.0      Specific Gravity, UA <1.005 (*)     Protein, UA Negative      Glucose, UA 3+ (*)     Ketones, UA Negative       Bilirubin (UA) Negative      Occult Blood UA Negative      Nitrite, UA Negative      Urobilinogen, UA Negative      Leukocytes, UA Negative      Narrative:     Specimen Source->Urine   HEPATITIS C ANTIBODY    Hepatitis C Ab Negative      Narrative:     Release to patient->Immediate   URINALYSIS MICROSCOPIC    RBC, UA 0      WBC, UA 2      Bacteria None      Yeast, UA None      Squam Epithel, UA 0      Microscopic Comment SEE COMMENT      Narrative:     Specimen Source->Urine          Imaging Results    None          Medications   ketorolac injection 30 mg (30 mg Intramuscular Given 11/5/24 1135)   acetaminophen tablet 650 mg (650 mg Oral Given 11/5/24 1206)     Medical Decision Making  Emergent evaluation a 59-year-old male who presents with complaint of nontraumatic back pain, history of lumbar stenosis.  Vital signs are benign, afebrile.  On exam he has full strength and sensation, no clonus, and by history and exam I do not suspect cauda equina syndrome or cord compression.  He has been taking tramadol at home without relief.  He is treated here with a Toradol injection.  Norco was ordered but not given since he is driving home.  Tylenol was ordered and it is placed.  Differential diagnosis also includes pyelonephritis, renal colic, musculoskeletal back pain, cauda equina syndrome, cord compression.  Urinalysis shows no evidence of infection or microscopic hematuria, there is glucose in the urine which correlates with patient's history of diabetes.  Patient is discharged in good condition and advised close follow-up and strict return precautions.    Amount and/or Complexity of Data Reviewed  Labs: ordered. Decision-making details documented in ED Course.    Risk  OTC drugs.  Prescription drug management.                                      Clinical Impression:  Final diagnoses:  [M54.50] Acute bilateral low back pain without sciatica (Primary)          ED Disposition Condition    Discharge Stable           ED Prescriptions       Medication Sig Dispense Start Date End Date Auth. Provider    ibuprofen (ADVIL,MOTRIN) 600 MG tablet Take 1 tablet (600 mg total) by mouth every 6 (six) hours as needed for Pain. 20 tablet 11/5/2024 -- Rebeka Ceja MD    oxyCODONE-acetaminophen (PERCOCET) 5-325 mg per tablet Take 1 tablet by mouth every 4 (four) hours as needed for Pain. 12 tablet 11/5/2024 -- Rebeka Ceja MD          Follow-up Information       Follow up With Specialties Details Why Contact Info    Your regular primary care doctor  Schedule an appointment as soon as possible for a visit  For symptom recheck and close follow-up     [facility] - Emergency Dept Emergency Medicine  As needed, If symptoms worsen [address]  [facility] [id number] [phone]             Rebeka Ceja MD  11/05/24 0456

## 2024-11-05 NOTE — ED NOTES
"Pt drove here, states can get a ride if he have to then stated, "But my car is here." Provider notified. Hydrocodone not given.   "

## 2024-11-05 NOTE — FIRST PROVIDER EVALUATION
Emergency Department TeleTriage Encounter Note      CHIEF COMPLAINT    Chief Complaint   Patient presents with    Back Pain     Non traumatic lower back pain x 2 weeks.        VITAL SIGNS   Initial Vitals [11/05/24 1005]   BP Pulse Resp Temp SpO2   (!) 150/83 82 16 97.3 °F (36.3 °C) 96 %      MAP       --            ALLERGIES    Review of patient's allergies indicates:   Allergen Reactions    Iodine and iodide containing products     Shellfish containing products        PROVIDER TRIAGE NOTE  This is a teletriage evaluation of a 59 y.o. male presenting to the ED with c/o low back pain for the past 2 weeks.  Pt states he believes he was dehydrated so he increased his water but pain has continued.  Pt states he has been taking tramadol and Ibuprofen with no relief.     PE: VSS.  NAD noted.      Plan: monitor    Limited physical exam via telehealth: The patient is awake, alert, answering questions appropriately and is not in respiratory distress. All ED beds are full at present; patient notified of this status.  Patient seen and medically screened by Nurse Practitioner via teletriage.      Initial orders will be placed and care will be transferred to an alternate provider when patient is roomed for a full evaluation. Any additional orders and the final disposition will be determined by that provider.           ORDERS  Labs Reviewed   HEPATITIS C ANTIBODY       ED Orders (720h ago, onward)      Start Ordered     Status Ordering Provider    11/05/24 1007 11/05/24 1007  Hepatitis C Antibody  STAT         Ordered ABDIRAHMAN AVITIA              Virtual Visit Note: The provider triage portion of this emergency department evaluation and documentation was performed via [employer], a HIPAA-compliant telemedicine application, in concert with a tele-presenter in the room. A face to face patient evaluation with one of my colleagues will occur once the patient is placed in an emergency department room.      DISCLAIMER: This note  was prepared with [employer] voice recognition transcription software. Garbled syntax, mangled pronouns, and other bizarre constructions may be attributed to that software system.